# Patient Record
Sex: MALE | Race: WHITE | NOT HISPANIC OR LATINO | Employment: OTHER | URBAN - NONMETROPOLITAN AREA
[De-identification: names, ages, dates, MRNs, and addresses within clinical notes are randomized per-mention and may not be internally consistent; named-entity substitution may affect disease eponyms.]

---

## 2023-02-16 ENCOUNTER — HOSPITAL ENCOUNTER (EMERGENCY)
Facility: HOSPITAL | Age: 63
Discharge: HOME | End: 2023-02-16
Attending: EMERGENCY MEDICINE

## 2023-02-16 ENCOUNTER — APPOINTMENT (EMERGENCY)
Dept: RADIOLOGY | Facility: HOSPITAL | Age: 63
End: 2023-02-16

## 2023-02-16 VITALS
RESPIRATION RATE: 18 BRPM | TEMPERATURE: 97.9 F | SYSTOLIC BLOOD PRESSURE: 152 MMHG | DIASTOLIC BLOOD PRESSURE: 72 MMHG | OXYGEN SATURATION: 100 % | WEIGHT: 165.34 LBS | HEART RATE: 67 BPM

## 2023-02-16 DIAGNOSIS — R07.89 ATYPICAL CHEST PAIN: Primary | ICD-10-CM

## 2023-02-16 LAB
ALBUMIN SERPL BCP-MCNC: 4.3 G/DL (ref 3.5–5)
ALP SERPL-CCNC: 53 U/L (ref 34–104)
ALT SERPL W P-5'-P-CCNC: 12 U/L (ref 7–52)
ANION GAP SERPL CALCULATED.3IONS-SCNC: 3 MMOL/L (ref 4–13)
AST SERPL W P-5'-P-CCNC: 26 U/L (ref 13–39)
BASOPHILS # BLD AUTO: 0.04 THOUSANDS/ÂΜL (ref 0–0.1)
BASOPHILS NFR BLD AUTO: 1 % (ref 0–1)
BILIRUB SERPL-MCNC: 0.68 MG/DL (ref 0.2–1)
BUN SERPL-MCNC: 29 MG/DL (ref 5–25)
CALCIUM SERPL-MCNC: 9 MG/DL (ref 8.4–10.2)
CARDIAC TROPONIN I PNL SERPL HS: 4 NG/L
CHLORIDE SERPL-SCNC: 105 MMOL/L (ref 96–108)
CO2 SERPL-SCNC: 30 MMOL/L (ref 21–32)
CREAT SERPL-MCNC: 1.1 MG/DL (ref 0.6–1.3)
EOSINOPHIL # BLD AUTO: 0.13 THOUSAND/ÂΜL (ref 0–0.61)
EOSINOPHIL NFR BLD AUTO: 2 % (ref 0–6)
ERYTHROCYTE [DISTWIDTH] IN BLOOD BY AUTOMATED COUNT: 12.7 % (ref 11.6–15.1)
GFR SERPL CREATININE-BSD FRML MDRD: 71 ML/MIN/1.73SQ M
GLUCOSE SERPL-MCNC: 74 MG/DL (ref 65–140)
HCT VFR BLD AUTO: 41.7 % (ref 36.5–49.3)
HGB BLD-MCNC: 13.6 G/DL (ref 12–17)
IMM GRANULOCYTES # BLD AUTO: 0.02 THOUSAND/UL (ref 0–0.2)
IMM GRANULOCYTES NFR BLD AUTO: 0 % (ref 0–2)
LYMPHOCYTES # BLD AUTO: 2.64 THOUSANDS/ÂΜL (ref 0.6–4.47)
LYMPHOCYTES NFR BLD AUTO: 39 % (ref 14–44)
MAGNESIUM SERPL-MCNC: 1.9 MG/DL (ref 1.9–2.7)
MCH RBC QN AUTO: 30.1 PG (ref 26.8–34.3)
MCHC RBC AUTO-ENTMCNC: 32.6 G/DL (ref 31.4–37.4)
MCV RBC AUTO: 92 FL (ref 82–98)
MONOCYTES # BLD AUTO: 0.64 THOUSAND/ÂΜL (ref 0.17–1.22)
MONOCYTES NFR BLD AUTO: 10 % (ref 4–12)
NEUTROPHILS # BLD AUTO: 3.3 THOUSANDS/ÂΜL (ref 1.85–7.62)
NEUTS SEG NFR BLD AUTO: 48 % (ref 43–75)
NRBC BLD AUTO-RTO: 0 /100 WBCS
PLATELET # BLD AUTO: 131 THOUSANDS/UL (ref 149–390)
PMV BLD AUTO: 11 FL (ref 8.9–12.7)
POTASSIUM SERPL-SCNC: 3.7 MMOL/L (ref 3.5–5.3)
PROT SERPL-MCNC: 6.6 G/DL (ref 6.4–8.4)
RBC # BLD AUTO: 4.52 MILLION/UL (ref 3.88–5.62)
SODIUM SERPL-SCNC: 138 MMOL/L (ref 135–147)
WBC # BLD AUTO: 6.77 THOUSAND/UL (ref 4.31–10.16)

## 2023-02-16 RX ORDER — ASPIRIN 81 MG/1
81 TABLET ORAL DAILY
COMMUNITY

## 2023-02-16 RX ORDER — CETIRIZINE HYDROCHLORIDE 10 MG/1
10 TABLET ORAL DAILY
COMMUNITY
Start: 2022-06-08 | End: 2023-06-08

## 2023-02-16 RX ORDER — SIMVASTATIN 40 MG
40 TABLET ORAL DAILY
COMMUNITY
Start: 2022-12-09

## 2023-02-16 RX ORDER — OMEPRAZOLE 20 MG/1
20 CAPSULE, DELAYED RELEASE ORAL DAILY
COMMUNITY

## 2023-02-16 NOTE — ED PROVIDER NOTES
History  Chief Complaint   Patient presents with   • Chest Pain     Pt arrives from home with c/o intermittent chest pain and mid back pain x2 weeks  Pt denies sob      Patient is a 41-year-old male past medical history of dyslipidemia who presents emerged department today with a chief complaint intermittent chest pain x2 weeks  The patient states that has had remittent episodes of chest pain, radiates across his chest and sometimes into his upper back  Patient states that it does go away with aspirin and Advil  He states that currently he is asymptomatic  States that he awoke at 5 AM without pain and gradually developed chest pain throughout the day  He did take Advil prior to arrival but it seemed to linger longer than normal     Denies any shortness of breath, nausea vomiting falls or traumas recent travel, dizziness headache, no back pain     denies smoking history  Patient states he is very active does take daily 81 mg aspirin  States he walks 2 miles every day and never has chest pain or shortness of breath with this  He has been taking omeprazole the last 4 days attempting to treat this if it was acid reflux related  No changes  Does not seem to be food related  Call to make an appointment with his PCP but to the ER due to chest pain  Does not have any chest pain during his 2 mile walks  States that he is very active during the day and constantly working on tasks  Samples being working on his  or doing mechanical stuff with his car  He states sometimes he is lifting heavy things  He thought possibly a muscle strain as well        Chest Pain  Pain location:  Substernal area  Pain quality: sharp    Pain radiates to:  Upper back  Pain radiates to the back: yes    Pain severity:  Moderate  Onset quality:  Unable to specify  Timing:  Intermittent  Chronicity:  New  Relieved by:  Aspirin  Associated symptoms: no abdominal pain, no altered mental status, no back pain, no diaphoresis, no dizziness, no fatigue, no headache, no lower extremity edema, no orthopnea, no shortness of breath, not vomiting and no weakness    Risk factors: high cholesterol    Risk factors: no prior DVT/PE and no smoking        Prior to Admission Medications   Prescriptions Last Dose Informant Patient Reported? Taking? Misc Natural Products (GLUCOSAMINE CHOND CMP TRIPLE PO) 2/16/2023  Yes Yes   Sig: Take 1 capsule by mouth daily   aspirin (ECOTRIN LOW STRENGTH) 81 mg EC tablet   Yes No   Sig: Take 81 mg by mouth daily   cetirizine (ZyrTEC) 10 mg tablet 2/16/2023  Yes Yes   Sig: Take 10 mg by mouth daily   omeprazole (PriLOSEC) 20 mg delayed release capsule 2/16/2023  Yes Yes   Sig: Take 20 mg by mouth daily   simvastatin (ZOCOR) 40 mg tablet   Yes No   Sig: Take 40 mg by mouth in the morning      Facility-Administered Medications: None       Past Medical History:   Diagnosis Date   • High cholesterol        History reviewed  No pertinent surgical history  History reviewed  No pertinent family history  I have reviewed and agree with the history as documented  E-Cigarette/Vaping   • E-Cigarette Use Never User      E-Cigarette/Vaping Substances     Social History     Tobacco Use   • Smoking status: Never   • Smokeless tobacco: Never   Vaping Use   • Vaping Use: Never used   Substance Use Topics   • Alcohol use: Not Currently   • Drug use: Not Currently       Review of Systems   Constitutional: Negative for diaphoresis and fatigue  Respiratory: Negative for shortness of breath  Cardiovascular: Positive for chest pain  Negative for orthopnea  Gastrointestinal: Negative for abdominal pain and vomiting  Musculoskeletal: Negative for back pain  Neurological: Negative for dizziness, weakness and headaches  All other systems reviewed and are negative  Physical Exam  Physical Exam  Vitals and nursing note reviewed  Constitutional:       General: He is not in acute distress       Appearance: He is well-developed  HENT:      Head: Normocephalic and atraumatic  Eyes:      Extraocular Movements: Extraocular movements intact  Conjunctiva/sclera: Conjunctivae normal       Pupils: Pupils are equal, round, and reactive to light  Cardiovascular:      Rate and Rhythm: Normal rate and regular rhythm  Heart sounds: Normal heart sounds  No murmur heard  Pulmonary:      Effort: Pulmonary effort is normal  No respiratory distress  Breath sounds: Normal breath sounds  Chest:      Chest wall: No tenderness  Abdominal:      Palpations: Abdomen is soft  Tenderness: There is no abdominal tenderness  Musculoskeletal:         General: No swelling  Cervical back: Neck supple  Right lower leg: No tenderness  No edema  Left lower leg: No tenderness  No edema  Skin:     General: Skin is warm and dry  Capillary Refill: Capillary refill takes less than 2 seconds  Neurological:      General: No focal deficit present  Mental Status: He is alert and oriented to person, place, and time     Psychiatric:         Mood and Affect: Mood normal          Vital Signs  ED Triage Vitals   Temperature Pulse Respirations Blood Pressure SpO2   02/16/23 1806 02/16/23 1802 02/16/23 1802 02/16/23 1802 02/16/23 1802   97 9 °F (36 6 °C) 67 18 (!) 191/92 100 %      Temp Source Heart Rate Source Patient Position - Orthostatic VS BP Location FiO2 (%)   02/16/23 1801 02/16/23 1801 02/16/23 1802 02/16/23 1802 --   Temporal Monitor Lying Right arm       Pain Score       02/16/23 1802       No Pain           Vitals:    02/16/23 1802 02/16/23 1814 02/16/23 1850   BP: (!) 191/92 162/79 152/72   Pulse: 67     Patient Position - Orthostatic VS: Lying           Visual Acuity      ED Medications  Medications - No data to display    Diagnostic Studies  Results Reviewed     Procedure Component Value Units Date/Time    HS Troponin 0hr (reflex protocol) [[id number]]  (Normal) Collected: 02/16/23 1810    Lab Status: Final result Specimen: Blood from Arm, Left Updated: 02/16/23 1842     hs TnI 0hr 4 ng/L     Comprehensive metabolic panel [[id number]]  (Abnormal) Collected: 02/16/23 1810    Lab Status: Final result Specimen: Blood from Arm, Left Updated: 02/16/23 1833     Sodium 138 mmol/L      Potassium 3 7 mmol/L      Chloride 105 mmol/L      CO2 30 mmol/L      ANION GAP 3 mmol/L      BUN 29 mg/dL      Creatinine 1 10 mg/dL      Glucose 74 mg/dL      Calcium 9 0 mg/dL      AST 26 U/L      ALT 12 U/L      Alkaline Phosphatase 53 U/L      Total Protein 6 6 g/dL      Albumin 4 3 g/dL      Total Bilirubin 0 68 mg/dL      eGFR 71 ml/min/1 73sq m     Narrative:      [place] guidelines for Chronic Kidney Disease (CKD):   •  Stage 1 with normal or high GFR (GFR > 90 mL/min/1 73 square meters)  •  Stage 2 Mild CKD (GFR = 60-89 mL/min/1 73 square meters)  •  Stage 3A Moderate CKD (GFR = 45-59 mL/min/1 73 square meters)  •  Stage 3B Moderate CKD (GFR = 30-44 mL/min/1 73 square meters)  •  Stage 4 Severe CKD (GFR = 15-29 mL/min/1 73 square meters)  •  Stage 5 End Stage CKD (GFR <15 mL/min/1 73 square meters)  Note: GFR calculation is accurate only with a steady state creatinine    Magnesium [[id number]]  (Normal) Collected: 02/16/23 1810    Lab Status: Final result Specimen: Blood from Arm, Left Updated: 02/16/23 1833     Magnesium 1 9 mg/dL     CBC and differential [[id number]]  (Abnormal) Collected: 02/16/23 1810    Lab Status: Final result Specimen: Blood from Arm, Left Updated: 02/16/23 1826     WBC 6 77 Thousand/uL      RBC 4 52 Million/uL      Hemoglobin 13 6 g/dL      Hematocrit 41 7 %      MCV 92 fL      MCH 30 1 pg      MCHC 32 6 g/dL      RDW 12 7 %      MPV 11 0 fL      Platelets 402 Thousands/uL      nRBC 0 /100 WBCs      Neutrophils Relative 48 %      Immat GRANS % 0 %      Lymphocytes Relative 39 %      Monocytes Relative 10 %      Eosinophils Relative 2 %      Basophils Relative 1 %      Neutrophils Absolute 3 30 Thousands/µL      Immature Grans Absolute 0 02 Thousand/uL      Lymphocytes Absolute 2 64 Thousands/µL      Monocytes Absolute 0 64 Thousand/µL      Eosinophils Absolute 0 13 Thousand/µL      Basophils Absolute 0 04 Thousands/µL                  XR chest 1 view portable   ED Interpretation by Darrin Ferrari PA-C (02/16 1846)   unremarkable      Final Result by Kayla Lawson MD (02/16 1856)      No acute cardiopulmonary disease  Workstation performed: [id number]                    Procedures  ECG 12 Lead Documentation Only    Date/Time: 2/16/2023 6:07 PM  Performed by: Darrin Ferrari PA-C  Authorized by: Darrin Ferrari PA-C     Indications / Diagnosis:  Cp  ECG reviewed by me, the ED Provider: yes    Patient location:  ED  Previous ECG:     Previous ECG:  Unavailable  Interpretation:     Interpretation: normal    Rate:     ECG rate:  62    ECG rate assessment: normal    Rhythm:     Rhythm: sinus rhythm    Conduction:     Conduction: normal    ST segments:     ST segments:  Normal  T waves:     T waves: normal               ED Course             HEART Risk Score    Flowsheet Row Most Recent Value   Heart Score Risk Calculator    History 0 Filed at: 02/16/2023 1957   ECG 0 Filed at: 02/16/2023 1957   Age 1 Filed at: 02/16/2023 1957   Risk Factors 1 Filed at: 02/16/2023 1957   Troponin 0 Filed at: 02/16/2023 1957   HEART Score 2 Filed at: 02/16/2023 1957                                      Medical Decision Making  Patient is a 79-year-old male past medical history of dyslipidemia who presents [facility] department today with a chief complaint intermittent chest pain x2 weeks  The patient states that has had remittent episodes of chest pain, radiates across his chest and sometimes into his upper back  Patient states that it does go away with aspirin and Advil  He states that currently he is asymptomatic    States that he awoke at 5 AM without pain and gradually developed chest pain throughout the day  He did take Advil prior to arrival but it seemed to linger longer than normal     Denies any shortness of breath, nausea vomiting falls or traumas recent travel, dizziness headache, no back pain     denies smoking history  Patient states he is very active does take daily 81 mg aspirin  States he walks 2 miles every day and never has chest pain or shortness of breath with this  He has been taking omeprazole the last 4 days attempting to treat this if it was acid reflux related  No changes  Does not seem to be food related  Call to make an appointment with his PCP but to the ER due to chest pain  Does not have any chest pain during his 2 mile walks  States that he is very active during the day and constantly working on tasks  Samples being working on his  or doing mechanical stuff with his car  He states sometimes he is lifting heavy things  He thought possibly a muscle strain as well  Lab Work unremarkable enzymes negative  EKG normal sinus rhythm without any acute ischemic changes  Chest pain never occurs with daily exercise  Patient discharged home to follow-up with primary care physician    Atypical chest pain: acute illness or injury  Amount and/or Complexity of Data Reviewed  Labs: ordered  Decision-making details documented in ED Course  Radiology: ordered and independent interpretation performed  Decision-making details documented in ED Course  ECG/medicine tests: ordered  Decision-making details documented in ED Course            Disposition  Final diagnoses:   Atypical chest pain     Time reflects when diagnosis was documented in both MDM as applicable and the Disposition within this note     Time User Action Codes Description Comment    2/16/2023  6:50 PM Acacia Moore Add [R07 89] Atypical chest pain       ED Disposition     ED Disposition   Discharge    Condition   Stable    Date/Time   Thu Feb 16, 2023  6:50 PM    Comment   Mercedez Bolaños THADDEUS Latif discharge to home/self care  Follow-up Information    None         Discharge Medication List as of 2/16/2023  6:50 PM      CONTINUE these medications which have NOT CHANGED    Details   cetirizine (ZyrTEC) 10 mg tablet Take 10 mg by mouth daily, Starting Wed 6/8/2022, Until Thu 6/8/2023, Historical Med      Misc Natural Products (GLUCOSAMINE CHOND CMP TRIPLE PO) Take 1 capsule by mouth daily, Historical Med      omeprazole (PriLOSEC) 20 mg delayed release capsule Take 20 mg by mouth daily, Historical Med      aspirin (ECOTRIN LOW STRENGTH) 81 mg EC tablet Take 81 mg by mouth daily, Historical Med      simvastatin (ZOCOR) 40 mg tablet Take 40 mg by mouth in the morning, Starting Fri 12/9/2022, Historical Med             No discharge procedures on file      PDMP Review     None          ED Provider  Electronically Signed by           Darrin Ferrari PA-C  02/1960

## 2023-02-20 LAB
ATRIAL RATE: 62 BPM
P AXIS: 56 DEGREES
PR INTERVAL: 126 MS
QRS AXIS: 58 DEGREES
QRSD INTERVAL: 96 MS
QT INTERVAL: 414 MS
QTC INTERVAL: 420 MS
T WAVE AXIS: 55 DEGREES
VENTRICULAR RATE: 62 BPM